# Patient Record
Sex: MALE | Race: WHITE
[De-identification: names, ages, dates, MRNs, and addresses within clinical notes are randomized per-mention and may not be internally consistent; named-entity substitution may affect disease eponyms.]

---

## 2018-01-19 ENCOUNTER — HOSPITAL ENCOUNTER (OUTPATIENT)
Dept: HOSPITAL 95 - ORSCSDS | Age: 63
Discharge: HOME | End: 2018-01-19
Payer: COMMERCIAL

## 2018-01-19 VITALS — WEIGHT: 225.38 LBS | BODY MASS INDEX: 32.27 KG/M2 | HEIGHT: 70 IN

## 2018-01-19 DIAGNOSIS — R13.10: Primary | ICD-10-CM

## 2018-01-19 DIAGNOSIS — G47.33: ICD-10-CM

## 2018-01-19 DIAGNOSIS — Z79.899: ICD-10-CM

## 2018-01-19 DIAGNOSIS — I25.810: ICD-10-CM

## 2018-01-19 DIAGNOSIS — E11.9: ICD-10-CM

## 2018-01-19 DIAGNOSIS — I10: ICD-10-CM

## 2018-01-19 DIAGNOSIS — Z79.82: ICD-10-CM

## 2018-01-19 DIAGNOSIS — Z83.71: ICD-10-CM

## 2018-01-19 DIAGNOSIS — Z12.11: ICD-10-CM

## 2018-01-19 DIAGNOSIS — K57.30: ICD-10-CM

## 2018-01-19 DIAGNOSIS — K64.8: ICD-10-CM

## 2018-01-19 DIAGNOSIS — Z79.84: ICD-10-CM

## 2018-01-19 PROCEDURE — 0DJD8ZZ INSPECTION OF LOWER INTESTINAL TRACT, VIA NATURAL OR ARTIFICIAL OPENING ENDOSCOPIC: ICD-10-PCS | Performed by: INTERNAL MEDICINE

## 2018-01-19 PROCEDURE — 43450 DILATE ESOPHAGUS 1/MULT PASS: CPT

## 2018-01-19 PROCEDURE — 0D758ZZ DILATION OF ESOPHAGUS, VIA NATURAL OR ARTIFICIAL OPENING ENDOSCOPIC: ICD-10-PCS | Performed by: INTERNAL MEDICINE

## 2018-01-19 PROCEDURE — 43248 EGD GUIDE WIRE INSERTION: CPT

## 2018-04-02 ENCOUNTER — HOSPITAL ENCOUNTER (OUTPATIENT)
Dept: HOSPITAL 95 - LAB EV | Age: 63
Discharge: HOME | End: 2018-04-02
Attending: FAMILY MEDICINE
Payer: COMMERCIAL

## 2018-04-02 DIAGNOSIS — E11.9: Primary | ICD-10-CM

## 2019-04-15 ENCOUNTER — HOSPITAL ENCOUNTER (OUTPATIENT)
Dept: HOSPITAL 95 - LAB SHORT | Age: 64
Discharge: HOME | End: 2019-04-15
Attending: FAMILY MEDICINE
Payer: COMMERCIAL

## 2019-04-15 DIAGNOSIS — E11.9: Primary | ICD-10-CM

## 2024-10-01 NOTE — NUR
Discharge instructions reviewed with patient. Patient verbalizes
understanding.  Copy given to patient to take home.  Patient States
Post-Procedure ride home has been arranged.  Discharged via wheelchair to
private car for ride home.

## 2024-10-01 NOTE — NUR
Patient confirms NPO status and agrees with scheduled surgery.
Ambulatory in Day SurgeryPre-Op teaching done. Pt verbalizes understanding.
History, Chart, Medications and Allergies reviewed before start of
procedure.Patient States Post-Procedure ride home has been arranged.

## 2024-10-01 NOTE — NUR
10/01/24 0753 Wayne Figueroa
History, Chart, Medications and Allergies reviewed before start of
procedure.MONITOR INTACT WITH CONTINUOUS PULSE OXIMETRY, CONTINUOUS
END TITAL CO2, AND INTERMITTENT BLOOD PRESSURE.3-LEAD EKG REVIEWED
WITH PHYSICIAN PRIOR TO START OF PROCEDURE.O2 VIA POM INTACT
THROUGHOUT SEDATION/PROCEDURE.See Anesthesia record.